# Patient Record
Sex: FEMALE | Race: ASIAN | Employment: STUDENT | URBAN - METROPOLITAN AREA
[De-identification: names, ages, dates, MRNs, and addresses within clinical notes are randomized per-mention and may not be internally consistent; named-entity substitution may affect disease eponyms.]

---
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
## 2022-06-10 ENCOUNTER — OFFICE VISIT (OUTPATIENT)
Dept: FAMILY MEDICINE CLINIC | Facility: CLINIC | Age: 22
End: 2022-06-10
Payer: COMMERCIAL

## 2022-06-10 VITALS
HEART RATE: 76 BPM | HEIGHT: 64 IN | OXYGEN SATURATION: 99 % | WEIGHT: 149.81 LBS | BODY MASS INDEX: 25.57 KG/M2 | TEMPERATURE: 98 F | SYSTOLIC BLOOD PRESSURE: 104 MMHG | DIASTOLIC BLOOD PRESSURE: 64 MMHG | RESPIRATION RATE: 14 BRPM

## 2022-06-10 DIAGNOSIS — L98.9 SKIN LESION: ICD-10-CM

## 2022-06-10 DIAGNOSIS — Z30.014 ENCOUNTER FOR INITIAL PRESCRIPTION OF INTRAUTERINE CONTRACEPTIVE DEVICE (IUD): ICD-10-CM

## 2022-06-10 DIAGNOSIS — N92.6 IRREGULAR MENSES: Primary | ICD-10-CM

## 2022-06-10 DIAGNOSIS — Z11.8 SCREENING FOR CHLAMYDIAL DISEASE: ICD-10-CM

## 2022-06-10 LAB — CONTROL LINE PRESENT WITH A CLEAR BACKGROUND (YES/NO): YES YES/NO

## 2022-06-10 PROCEDURE — 87591 N.GONORRHOEAE DNA AMP PROB: CPT | Performed by: FAMILY MEDICINE

## 2022-06-10 PROCEDURE — 81025 URINE PREGNANCY TEST: CPT | Performed by: FAMILY MEDICINE

## 2022-06-10 PROCEDURE — 87491 CHLMYD TRACH DNA AMP PROBE: CPT | Performed by: FAMILY MEDICINE

## 2022-06-10 PROCEDURE — 3078F DIAST BP <80 MM HG: CPT | Performed by: FAMILY MEDICINE

## 2022-06-10 PROCEDURE — 3074F SYST BP LT 130 MM HG: CPT | Performed by: FAMILY MEDICINE

## 2022-06-10 PROCEDURE — 3008F BODY MASS INDEX DOCD: CPT | Performed by: FAMILY MEDICINE

## 2022-06-10 PROCEDURE — 99214 OFFICE O/P EST MOD 30 MIN: CPT | Performed by: FAMILY MEDICINE

## 2022-06-10 RX ORDER — NORGESTIMATE AND ETHINYL ESTRADIOL 7DAYSX3 LO
1 KIT ORAL DAILY
Qty: 3 EACH | Refills: 3 | Status: SHIPPED | OUTPATIENT
Start: 2022-06-10 | End: 2023-06-05

## 2022-06-11 PROBLEM — N92.6 IRREGULAR MENSES: Status: ACTIVE | Noted: 2022-06-11

## 2022-06-13 LAB
C TRACH DNA SPEC QL NAA+PROBE: NEGATIVE
N GONORRHOEA DNA SPEC QL NAA+PROBE: NEGATIVE

## 2022-06-24 ENCOUNTER — LABORATORY ENCOUNTER (OUTPATIENT)
Dept: LAB | Age: 22
End: 2022-06-24
Attending: FAMILY MEDICINE
Payer: COMMERCIAL

## 2022-06-24 DIAGNOSIS — N92.6 IRREGULAR MENSES: ICD-10-CM

## 2022-06-24 LAB
FSH SERPL-ACNC: 1.7 MIU/ML
PROGEST SERPL-MCNC: 1.36 NG/ML
PROLACTIN SERPL-MCNC: 13 NG/ML
TSI SER-ACNC: 2.04 MIU/ML (ref 0.36–3.74)

## 2022-06-24 PROCEDURE — 84146 ASSAY OF PROLACTIN: CPT | Performed by: FAMILY MEDICINE

## 2022-06-24 PROCEDURE — 84402 ASSAY OF FREE TESTOSTERONE: CPT | Performed by: FAMILY MEDICINE

## 2022-06-24 PROCEDURE — 82627 DEHYDROEPIANDROSTERONE: CPT | Performed by: FAMILY MEDICINE

## 2022-06-24 PROCEDURE — 84403 ASSAY OF TOTAL TESTOSTERONE: CPT | Performed by: FAMILY MEDICINE

## 2022-06-24 PROCEDURE — 84144 ASSAY OF PROGESTERONE: CPT | Performed by: FAMILY MEDICINE

## 2022-06-24 PROCEDURE — 84443 ASSAY THYROID STIM HORMONE: CPT | Performed by: FAMILY MEDICINE

## 2022-06-24 PROCEDURE — 83001 ASSAY OF GONADOTROPIN (FSH): CPT | Performed by: FAMILY MEDICINE

## 2022-06-25 LAB — DHEA-S SERPL-MCNC: 304.9 UG/DL

## 2022-07-06 LAB
SEX HORMONE BINDING GLOBULIN: 117 NMOL/L
TESTOSTERONE -MS, BIOAVAILAB: 5 NG/DL
TESTOSTERONE, -MS/MS: 25 NG/DL
TESTOSTERONE, FREE -MS/MS: 1.7 PG/ML

## 2022-08-05 DIAGNOSIS — N92.6 IRREGULAR MENSES: ICD-10-CM

## 2022-08-08 DIAGNOSIS — N92.6 IRREGULAR MENSES: ICD-10-CM

## 2022-08-08 RX ORDER — NORGESTIMATE AND ETHINYL ESTRADIOL 7DAYSX3 LO
1 KIT ORAL DAILY
Qty: 3 EACH | Refills: 3 | OUTPATIENT
Start: 2022-08-08 | End: 2023-08-03

## 2022-08-14 ENCOUNTER — PATIENT MESSAGE (OUTPATIENT)
Dept: FAMILY MEDICINE CLINIC | Facility: CLINIC | Age: 22
End: 2022-08-14

## 2022-08-14 DIAGNOSIS — R41.840 ATTENTION DEFICIT: Primary | ICD-10-CM

## 2022-10-03 ENCOUNTER — TELEMEDICINE (OUTPATIENT)
Dept: FAMILY MEDICINE CLINIC | Facility: CLINIC | Age: 22
End: 2022-10-03

## 2022-10-03 DIAGNOSIS — B97.89 VIRAL RESPIRATORY ILLNESS: Primary | ICD-10-CM

## 2022-10-03 DIAGNOSIS — J98.8 VIRAL RESPIRATORY ILLNESS: Primary | ICD-10-CM

## 2022-10-03 NOTE — PROGRESS NOTES
Video Visit    This visit is conducted using Telemedicine with live, interactive video and audio. Brennan Mendosa  verbally consents to a Video visit. Patient understands and accepts financial responsibility for any deductible, co-insurance and/or co-pays associated with this service. Duration of the service: 9:05 minutes      Summary of topics discussed:     Possible Flu: Pt has had a sore throat that started on 9/29. The symptoms progressed wo a dry cough and fever the next day. She stayed home from school (she is in college). Since then her cough has become increasingly productive with with yellow and green sputum, as well has having a congested, stuffy nose. Her cough seems to be worse at night. She has no SOB, no chest tightness. She has some diarrhea. She did not test for Covid. She has been taking Dayquil and Ibuprofen prn. Of note, she had an acute Covid infection 1 month ago. ROS: as states per HPI  Physical Exam: Exam is limited due to no face to face visit today due to the Covid-19 pandemic. Pt is awake and alert, does not appear to be in acute distress. She has no cough during video visit, no wheezing, no conversational dyspnea. Assessment/Plan:  1) Acute viral illness  - onset on 9/29, advised to r/o Covid by taking an at home Covid test  - if positive please inform office; if negative and still having symptoms 7-10 days since onset, then please contact office and will assume she has bacterial infection and will send in ABx  - supportive care discussed  - declined Rx for Benzonatate      Return if symptoms worsen or fail to improve, for if COvid negative and still feeling sick end of this week, please call for ABx.       Stacey Elena MD

## 2022-11-04 DIAGNOSIS — N92.6 IRREGULAR MENSES: ICD-10-CM

## 2022-11-04 RX ORDER — NORGESTIMATE AND ETHINYL ESTRADIOL 7DAYSX3 LO
1 KIT ORAL DAILY
Qty: 3 EACH | Refills: 1 | Status: SHIPPED | OUTPATIENT
Start: 2022-11-04 | End: 2023-10-30

## 2022-11-07 NOTE — TELEPHONE ENCOUNTER
Spoke with patient, patient verbalized understating.  Has Follow up apt scheduled for 12/16/2022 at 2:00 pm.

## 2022-11-13 ENCOUNTER — PATIENT MESSAGE (OUTPATIENT)
Dept: FAMILY MEDICINE CLINIC | Facility: CLINIC | Age: 22
End: 2022-11-13

## 2023-01-24 ENCOUNTER — TELEPHONE (OUTPATIENT)
Dept: FAMILY MEDICINE CLINIC | Facility: CLINIC | Age: 23
End: 2023-01-24

## 2023-01-24 NOTE — TELEPHONE ENCOUNTER
Received her evaluation from Neuropsych (Dr. Irene Gonzáles). Please schedule appt to discuss results. [id number] [address] for virtual visit, if needed. Thanks.

## 2023-11-27 ENCOUNTER — OFFICE VISIT (OUTPATIENT)
Dept: FAMILY MEDICINE CLINIC | Facility: CLINIC | Age: 23
End: 2023-11-27
Payer: COMMERCIAL

## 2023-11-27 VITALS
SYSTOLIC BLOOD PRESSURE: 100 MMHG | OXYGEN SATURATION: 100 % | RESPIRATION RATE: 20 BRPM | WEIGHT: 148.19 LBS | BODY MASS INDEX: 25.3 KG/M2 | TEMPERATURE: 97 F | HEART RATE: 70 BPM | HEIGHT: 64 IN | DIASTOLIC BLOOD PRESSURE: 64 MMHG

## 2023-11-27 DIAGNOSIS — N92.6 IRREGULAR MENSES: ICD-10-CM

## 2023-11-27 DIAGNOSIS — Z23 NEED FOR VACCINATION: ICD-10-CM

## 2023-11-27 DIAGNOSIS — N92.6 MISSED PERIODS: Primary | ICD-10-CM

## 2023-11-27 LAB
CONTROL LINE PRESENT WITH A CLEAR BACKGROUND (YES/NO): YES YES/NO
KIT LOT #: NORMAL NUMERIC
PREGNANCY TEST, URINE: NEGATIVE

## 2023-11-27 PROCEDURE — 81025 URINE PREGNANCY TEST: CPT | Performed by: FAMILY MEDICINE

## 2023-11-27 PROCEDURE — 3008F BODY MASS INDEX DOCD: CPT | Performed by: FAMILY MEDICINE

## 2023-11-27 PROCEDURE — 99214 OFFICE O/P EST MOD 30 MIN: CPT | Performed by: FAMILY MEDICINE

## 2023-11-27 PROCEDURE — 3078F DIAST BP <80 MM HG: CPT | Performed by: FAMILY MEDICINE

## 2023-11-27 PROCEDURE — 3074F SYST BP LT 130 MM HG: CPT | Performed by: FAMILY MEDICINE

## 2023-11-27 RX ORDER — NORGESTIMATE AND ETHINYL ESTRADIOL 7DAYSX3 LO
1 KIT ORAL DAILY
Qty: 3 EACH | Refills: 1 | Status: SHIPPED | OUTPATIENT
Start: 2023-11-27 | End: 2024-11-21

## 2023-11-27 RX ORDER — NORGESTIMATE AND ETHINYL ESTRADIOL 7DAYSX3 LO
1 KIT ORAL DAILY
Qty: 3 EACH | Refills: 1 | Status: SHIPPED | OUTPATIENT
Start: 2023-11-27 | End: 2023-11-27

## 2023-12-27 ENCOUNTER — HOSPITAL ENCOUNTER (OUTPATIENT)
Dept: ULTRASOUND IMAGING | Age: 23
Discharge: HOME | End: 2023-12-27
Attending: FAMILY MEDICINE
Payer: COMMERCIAL

## 2023-12-27 DIAGNOSIS — N92.6 IRREGULAR MENSES: ICD-10-CM

## 2023-12-27 PROCEDURE — 76830 TRANSVAGINAL US NON-OB: CPT | Performed by: FAMILY MEDICINE

## 2023-12-27 PROCEDURE — 76856 US EXAM PELVIC COMPLETE: CPT | Performed by: FAMILY MEDICINE

## 2024-01-03 ENCOUNTER — OFFICE VISIT (OUTPATIENT)
Dept: FAMILY MEDICINE CLINIC | Facility: CLINIC | Age: 24
End: 2024-01-03
Payer: COMMERCIAL

## 2024-01-03 VITALS
HEART RATE: 59 BPM | OXYGEN SATURATION: 98 % | WEIGHT: 147 LBS | TEMPERATURE: 97 F | RESPIRATION RATE: 18 BRPM | BODY MASS INDEX: 25 KG/M2 | DIASTOLIC BLOOD PRESSURE: 62 MMHG | SYSTOLIC BLOOD PRESSURE: 108 MMHG

## 2024-01-03 DIAGNOSIS — Z12.4 CERVICAL CANCER SCREENING: ICD-10-CM

## 2024-01-03 DIAGNOSIS — N89.8 VAGINAL ITCHING: Primary | ICD-10-CM

## 2024-01-03 DIAGNOSIS — R35.0 FREQUENT URINATION: ICD-10-CM

## 2024-01-03 DIAGNOSIS — Z23 NEED FOR VACCINATION: ICD-10-CM

## 2024-01-03 LAB
APPEARANCE: CLEAR
BILIRUBIN: NEGATIVE
GLUCOSE (URINE DIPSTICK): NEGATIVE MG/DL
KETONES (URINE DIPSTICK): NEGATIVE MG/DL
LEUKOCYTES: NEGATIVE
MULTISTIX LOT#: NORMAL NUMERIC
NITRITE, URINE: NEGATIVE
OCCULT BLOOD: NEGATIVE
PH, URINE: 5.5 (ref 4.5–8)
PROTEIN (URINE DIPSTICK): NEGATIVE MG/DL
SPECIFIC GRAVITY: 1.03 (ref 1–1.03)
URINE-COLOR: YELLOW
UROBILINOGEN,SEMI-QN: 0.2 MG/DL (ref 0–1.9)

## 2024-01-03 PROCEDURE — 99214 OFFICE O/P EST MOD 30 MIN: CPT | Performed by: FAMILY MEDICINE

## 2024-01-03 PROCEDURE — 90715 TDAP VACCINE 7 YRS/> IM: CPT | Performed by: FAMILY MEDICINE

## 2024-01-03 PROCEDURE — 81514 NFCT DS BV&VAGINITIS DNA ALG: CPT | Performed by: FAMILY MEDICINE

## 2024-01-03 PROCEDURE — 90471 IMMUNIZATION ADMIN: CPT | Performed by: FAMILY MEDICINE

## 2024-01-03 PROCEDURE — 87591 N.GONORRHOEAE DNA AMP PROB: CPT | Performed by: FAMILY MEDICINE

## 2024-01-03 PROCEDURE — 87491 CHLMYD TRACH DNA AMP PROBE: CPT | Performed by: FAMILY MEDICINE

## 2024-01-03 PROCEDURE — 3074F SYST BP LT 130 MM HG: CPT | Performed by: FAMILY MEDICINE

## 2024-01-03 PROCEDURE — 3078F DIAST BP <80 MM HG: CPT | Performed by: FAMILY MEDICINE

## 2024-01-03 PROCEDURE — 81003 URINALYSIS AUTO W/O SCOPE: CPT | Performed by: FAMILY MEDICINE

## 2024-01-03 PROCEDURE — 88175 CYTOPATH C/V AUTO FLUID REDO: CPT | Performed by: FAMILY MEDICINE

## 2024-01-03 RX ORDER — FLUCONAZOLE 150 MG/1
150 TABLET ORAL ONCE
Qty: 1 TABLET | Refills: 0 | Status: SHIPPED | OUTPATIENT
Start: 2024-01-03 | End: 2024-01-03

## 2024-01-03 NOTE — PROGRESS NOTES
CHIEF COMPLAINT:   Chief Complaint   Patient presents with    UTI     Burning     Vaginal Problem     Itching          HPI:     Barnali Behari is a 23 year old female presents for UTI and vaginal itch.     Vaginal itch and possible UTI: She has had a vaginal itch x 4 wks, feeling itchy inside and outside. She has no vaginal discharge.  She also developed symptoms of burning with urination a week later after the vaginal itch started.  She is sexually active, had had these problems since being active with her new boyfriend.  She has no F/C, no flank pain or abdominal pain.              HISTORY:  Past Medical History:   Diagnosis Date    Anxiety 23    Depression 23    Femur fracture, left (HCC)       History reviewed. No pertinent surgical history.   Family History   Problem Relation Age of Onset    No Known Problems Mother     No Known Problems Father     Stroke Maternal Grandmother     Hypertension Maternal Grandmother             No Known Problems Maternal Grandfather     Hypertension Paternal Grandmother     Cancer Paternal Grandfather               Social History:   Social History     Socioeconomic History    Marital status: Single   Tobacco Use    Smoking status: Never    Smokeless tobacco: Never   Vaping Use    Vaping Use: Never used   Substance and Sexual Activity    Alcohol use: Not Currently     Alcohol/week: 3.0 standard drinks of alcohol     Types: 3 Cans of beer per week    Drug use: No   Other Topics Concern    Caffeine Concern No    Stress Concern Yes     Comment: Lack of sleep due to school    Weight Concern Yes     Comment: fluctuates a bit    Special Diet No    Exercise No    Seat Belt No        Medications (Active prior to today's visit):  Current Outpatient Medications   Medication Sig Dispense Refill    fluconazole 150 MG Oral Tab Take 1 tablet (150 mg total) by mouth once for 1 dose. 1 tablet 0    Norgestim-Eth Estrad Triphasic (ORTHO TRI-CYCLEN LO) 0.18/0.215/0.25  MG-25 MCG Oral Tab Take 1 tablet by mouth daily. 3 each 0       Allergies:  No Known Allergies    PSFH elements reviewed from today and agreed except as otherwise stated in HPI.  ROS:     Review of Systems   Constitutional:  Negative for appetite change, chills and fatigue.   Genitourinary:  Positive for dysuria. Negative for flank pain, hematuria, pelvic pain and vaginal discharge.        Vaginal itch   Musculoskeletal:  Negative for back pain.         Pertinent positives and negatives noted in the the HPI.    PHYSICAL EXAM:   /62 (BP Location: Left arm, Patient Position: Sitting, Cuff Size: adult)   Pulse 59   Temp 97.2 °F (36.2 °C) (Temporal)   Resp 18   Wt 147 lb (66.7 kg)   LMP 07/01/2023 (Approximate)   SpO2 98%   BMI 25.23 kg/m²   Vital signs reviewed.Appears stated age, well groomed.  Physical Exam  Vitals reviewed.   Constitutional:       Appearance: Normal appearance.   HENT:      Head: Normocephalic.   Cardiovascular:      Rate and Rhythm: Normal rate and regular rhythm.      Pulses: Normal pulses.      Heart sounds: Normal heart sounds.   Pulmonary:      Effort: Pulmonary effort is normal.      Breath sounds: Normal breath sounds.   Abdominal:      General: Bowel sounds are normal. There is no distension.      Palpations: Abdomen is soft.      Tenderness: There is no abdominal tenderness. There is no right CVA tenderness, left CVA tenderness or guarding.   Genitourinary:     General: Normal vulva.      Vagina: Vaginal discharge present.      Comments: Speculum exam: vaginal mucosa normal, has scant amount of thick white discharge discharge. Cervix is closed and has no erythema, is not friable. Thinprep pap collected today.    Bimanual exam: Uterus is nontender and normal size. No adnexal fullness or tenderness. No CMT.      Skin:     General: Skin is warm and dry.   Neurological:      Mental Status: She is alert and oriented to person, place, and time.   Psychiatric:         Behavior:  Behavior normal.          LABS     Office Visit on 01/03/2024   Component Date Value    Glucose Urine 01/03/2024 Negative     Bilirubin Urine 01/03/2024 Negative     Ketones, UA 01/03/2024 Negative     Spec Gravity 01/03/2024 1.030     Blood Urine 01/03/2024 Negative     PH Urine 01/03/2024 5.5     Protein Urine 01/03/2024 Negative     Urobilinogen Urine 01/03/2024 0.2     Nitrite Urine 01/03/2024 Negative     Leukocyte Esterase Urine 01/03/2024 Negative     APPEARANCE 01/03/2024 clear     Color Urine 01/03/2024 yellow     Multistix Lot# 01/03/2024 [id number]     Multistix Expiration Date 01/03/2024 1/31/24    Office Visit on 11/27/2023   Component Date Value    Pregnancy Test, Urine 11/27/2023 negative     Control Line Present wit* 11/27/2023 yes     Kit Lot # 11/27/2023 [id number]     Kit Expiration Date 11/27/2023 01/04/2025       REVIEWED THIS VISIT  ASSESSMENT/PLAN:   23 year old female with    1. Vaginal itching  - vaginal culture collected today, await results  - empirically start Fluconazole given clinical appearance of discharge  - check GC/Chlamydia    - Vaginitis Vaginosis PCR Panel; Future  - Chlamydia/Gc Amplification [E]; Future  - fluconazole 150 MG Oral Tab; Take 1 tablet (150 mg total) by mouth once for 1 dose.  Dispense: 1 tablet; Refill: 0  - Vaginitis Vaginosis PCR Panel  - Chlamydia/Gc Amplification [E]    2. Frequent urination  - UA normal    - URINALYSIS, AUTO, W/O SCOPE    3. Cervical cancer screening    - ThinPrep PAP with HPV Reflex Request; Future  - ThinPrep PAP with HPV Reflex Request    4. Need for vaccination    - TdaP (Adacel, Boostrix) [47613]      Meds This Visit:  Requested Prescriptions     Signed Prescriptions Disp Refills    fluconazole 150 MG Oral Tab 1 tablet 0     Sig: Take 1 tablet (150 mg total) by mouth once for 1 dose.       Health Maintenance:  Health Maintenance   Topic Date Due    Annual Physical  Never done    Pap Smear  Never done    Chlamydia Screening  06/10/2023     COVID-19 Vaccine (5 - 2023-24 season) 09/01/2023    Annual Depression Screening  01/01/2024    Influenza Vaccine (1) 06/30/2024 (Originally 10/1/2023)    DTaP,Tdap,and Td Vaccines (8 - Td or Tdap) 01/03/2034    Hepatitis B Vaccines  Completed    Hepatitis A Vaccines  Completed    MMR Vaccines  Completed    Varicella Vaccines  Completed    Meningococcal Vaccine  Completed    HPV Vaccines  Completed    Pneumococcal Vaccine: Birth to 64yrs  Aged Out         Patient/Caregiver Education: There are no barriers to learning. Medical education done.   Outcome: Patient verbalizes understanding and agrees with plan. Advised to call or RTC if symptoms persist or worsen.    Problem List:     Patient Active Problem List   Diagnosis    Healthy adult on routine physical examination    Irregular menses       Imaging & Referrals:  TETANUS, DIPHTHERIA TOXOIDS AND ACELLULAR PERTUSIS VACCINE (TDAP), >7 YEARS, IM USE     1/3/2024  Clarissa Fitch MD      Patient understands plan and follow-up.  Return for annual physical overdue.

## 2024-01-04 LAB
BV BACTERIA DNA VAG QL NAA+PROBE: NEGATIVE
C GLABRATA DNA VAG QL NAA+PROBE: NEGATIVE
C KRUSEI DNA VAG QL NAA+PROBE: NEGATIVE
C TRACH DNA SPEC QL NAA+PROBE: NEGATIVE
CANDIDA DNA VAG QL NAA+PROBE: NEGATIVE
N GONORRHOEA DNA SPEC QL NAA+PROBE: NEGATIVE
T VAGINALIS DNA VAG QL NAA+PROBE: NEGATIVE

## 2024-01-08 LAB
.: NORMAL
.: NORMAL

## 2024-05-31 ENCOUNTER — PATIENT MESSAGE (OUTPATIENT)
Dept: FAMILY MEDICINE CLINIC | Facility: CLINIC | Age: 24
End: 2024-05-31

## 2024-06-04 NOTE — TELEPHONE ENCOUNTER
From: Barnali Behari  To: Clarissa Fitch  Sent: 5/31/2024 7:52 PM CDT  Subject: Question about contraception     Hello Dr. Taylor, I am on the mixed pill, Tri-Lo.     Recently I decided to skip my period by discarding placebo pills and starting my new pack right away, I have taken the first two pills, which corresponded to Wednesday and Thursday (May 29, May 30).    But I am travelling for today until monday (May 31-June 3rd) and have forgotten my pills at home.     I was wondering if I am protected today (May 31st)?  And should I consider emergency contraception for the remaining days or will the “placebo” week protection continue?     Thank you!     Regards,  Barnali Behari

## 2024-06-07 ENCOUNTER — OFFICE VISIT (OUTPATIENT)
Dept: FAMILY MEDICINE CLINIC | Facility: CLINIC | Age: 24
End: 2024-06-07
Payer: COMMERCIAL

## 2024-06-07 VITALS
RESPIRATION RATE: 18 BRPM | TEMPERATURE: 98 F | HEART RATE: 87 BPM | OXYGEN SATURATION: 98 % | BODY MASS INDEX: 25.29 KG/M2 | HEIGHT: 65 IN | DIASTOLIC BLOOD PRESSURE: 60 MMHG | SYSTOLIC BLOOD PRESSURE: 118 MMHG | WEIGHT: 151.81 LBS

## 2024-06-07 DIAGNOSIS — Z11.8 SCREENING FOR CHLAMYDIAL DISEASE: ICD-10-CM

## 2024-06-07 DIAGNOSIS — R41.3 MEMORY PROBLEM: ICD-10-CM

## 2024-06-07 DIAGNOSIS — Z13.21 SCREENING FOR ENDOCRINE, NUTRITIONAL, METABOLIC AND IMMUNITY DISORDER: ICD-10-CM

## 2024-06-07 DIAGNOSIS — Z00.00 ANNUAL PHYSICAL EXAM: Primary | ICD-10-CM

## 2024-06-07 DIAGNOSIS — N92.6 IRREGULAR MENSES: ICD-10-CM

## 2024-06-07 DIAGNOSIS — Z13.29 SCREENING FOR ENDOCRINE, NUTRITIONAL, METABOLIC AND IMMUNITY DISORDER: ICD-10-CM

## 2024-06-07 DIAGNOSIS — Z13.6 SCREENING FOR ISCHEMIC HEART DISEASE: ICD-10-CM

## 2024-06-07 DIAGNOSIS — E55.9 VITAMIN D DEFICIENCY: ICD-10-CM

## 2024-06-07 DIAGNOSIS — Z13.0 SCREENING FOR ENDOCRINE, NUTRITIONAL, METABOLIC AND IMMUNITY DISORDER: ICD-10-CM

## 2024-06-07 DIAGNOSIS — N92.6 MISSED PERIODS: ICD-10-CM

## 2024-06-07 DIAGNOSIS — Z13.228 SCREENING FOR ENDOCRINE, NUTRITIONAL, METABOLIC AND IMMUNITY DISORDER: ICD-10-CM

## 2024-06-07 PROCEDURE — 3078F DIAST BP <80 MM HG: CPT | Performed by: FAMILY MEDICINE

## 2024-06-07 PROCEDURE — 99395 PREV VISIT EST AGE 18-39: CPT | Performed by: FAMILY MEDICINE

## 2024-06-07 PROCEDURE — 3074F SYST BP LT 130 MM HG: CPT | Performed by: FAMILY MEDICINE

## 2024-06-07 PROCEDURE — 3008F BODY MASS INDEX DOCD: CPT | Performed by: FAMILY MEDICINE

## 2024-06-07 RX ORDER — NORGESTIMATE AND ETHINYL ESTRADIOL 7DAYSX3 LO
1 KIT ORAL DAILY
Qty: 3 EACH | Refills: 0 | Status: SHIPPED | OUTPATIENT
Start: 2024-06-07 | End: 2025-06-02

## 2024-06-08 NOTE — PROGRESS NOTES
Chief Complaint   Patient presents with    Physical     Annual exam w/o pap exam        HPI:   Barnali Behari is a 24 year old female who presents for a complete physical without gyne exam.   Patient feels well, dental visit up to date, no hearing problem.  Vaccinations: UTD    LMP: 24  Sexual activity:not discussed  Contraception: on OCPs  Exercise: occasional.  Diet:  regular    Wt Readings from Last 3 Encounters:   24 151 lb 12.8 oz (68.9 kg)   24 147 lb (66.7 kg)   23 148 lb 3.2 oz (67.2 kg)      BP Readings from Last 3 Encounters:   24 118/60   24 108/62   23 100/64     Patient's last menstrual period was 2024 (approximate).     Annual physical:  Overall pt states she feels well. She graduated from [employer] and is now home from school, looking for a job.    LMP: 24  Sexually Active: not discussed  Last pap smear:  2024, normal pap (rpt in 3 yrs)  Last mammogram: n/a  Last colonoscopy: n/a  Last DEXA Scan: n/a    Exercise: occasional  Diet: regular    Pt states she has some problems with remembering things, more short term memory.   She is requesting Vit B12 level check.     Also requesting Vit D level check.            Current Outpatient Medications   Medication Sig Dispense Refill    Norgestim-Eth Estrad Triphasic (ORTHO TRI-CYCLEN LO) 0.18/0.215/0.25 MG-25 MCG Oral Tab Take 1 tablet by mouth daily. 3 each 0      Past Medical History:    Anxiety    Depression    Femur fracture, left (HCC)      History reviewed. No pertinent surgical history.   Family History   Problem Relation Age of Onset    No Known Problems Mother     No Known Problems Father     Stroke Maternal Grandmother     Hypertension Maternal Grandmother             No Known Problems Maternal Grandfather     Hypertension Paternal Grandmother     Cancer Paternal Grandfather               Social History:  Social History     Socioeconomic History    Marital status: Single    Tobacco Use    Smoking status: Never    Smokeless tobacco: Never   Vaping Use    Vaping status: Never Used   Substance and Sexual Activity    Alcohol use: Not Currently     Alcohol/week: 3.0 standard drinks of alcohol     Types: 3 Cans of beer per week    Drug use: Yes     Frequency: 1.0 times per week     Types: Cannabis   Other Topics Concern    Caffeine Concern No    Stress Concern Yes     Comment: Lack of sleep due to school    Weight Concern Yes     Comment: fluctuates a bit    Special Diet No    Exercise No    Seat Belt No       Allergies:  No Known Allergies     REVIEW OF SYSTEMS:     Review of Systems   Constitutional:  Negative for appetite change and unexpected weight change.   Gastrointestinal:  Negative for abdominal pain, constipation, diarrhea, nausea and vomiting.   Genitourinary:  Negative for dysuria and menstrual problem.   Psychiatric/Behavioral:  Negative for decreased concentration.         EXAM:   /60 (BP Location: Right arm, Patient Position: Sitting, Cuff Size: adult)   Pulse 87   Temp 98 °F (36.7 °C) (Temporal)   Resp 18   Ht 5' 5\" (1.651 m)   Wt 151 lb 12.8 oz (68.9 kg)   LMP 05/30/2024 (Approximate)   SpO2 98%   BMI 25.26 kg/m²    GENERAL: WD/WN in no acute distress.   HEENT: PERRLA and EOMI.  OP moist no lesions.TM WNL, shad.Normal ears canals bilaterally.  Neck is supple, with no cervical LAD or thyroid abnormalities.  LUNGS: are clear to auscultation bilaterally, with no wheeze, rhonchi, or rales.   HEART: is RRR.  S1, S2, with no murmurs,clicks, gallops  BREAST:deferred  ABDOMEN: is soft,NBS, NT/ND with no HSM.  No rebound or guarding. No CVA tenderness, no hernias.   EXAM:deferred  RECTAL EXAM: deferred  NEURO: Cranial nerves II-XII normal,no focal abnormalities, and reflexes coordination and gait normal and symmetric.Sensation intact.  EXTREMETIES: are symmetric with no cyanosis, clubbing, or edema.  MS: Normal muscles tones, no joints abnormalities.  SKIN: Normal  color, turgor, no lesions, rashes or wounds.  PSYCH: normal affect and mood.    ASSESSMENT AND PLAN:     24 year old female with     1. Annual physical exam  Routine labs ordered today, await results. Counseled pt on healthy lifestyle changes. Vaccines today: UTD . Contraception: OCPs .     Last pap smear:  1/2024, normal pap (rpt in 3 yrs)    - Norgestim-Eth Estrad Triphasic (ORTHO TRI-CYCLEN LO) 0.18/0.215/0.25 MG-25 MCG Oral Tab; Take 1 tablet by mouth daily.  Dispense: 3 each; Refill: 0  - CBC With Differential With Platelet; Future  - Comp Metabolic Panel; Future  - Lipid Panel; Future  - TSH W Reflex To Free T4; Future    2. Missed periods  On OCPs for this    - Norgestim-Eth Estrad Triphasic (ORTHO TRI-CYCLEN LO) 0.18/0.215/0.25 MG-25 MCG Oral Tab; Take 1 tablet by mouth daily.  Dispense: 3 each; Refill: 0    3. Irregular menses  On OCPs for this    - Norgestim-Eth Estrad Triphasic (ORTHO TRI-CYCLEN LO) 0.18/0.215/0.25 MG-25 MCG Oral Tab; Take 1 tablet by mouth daily.  Dispense: 3 each; Refill: 0    4. Vitamin D deficiency  - pt requesting recheck of level    - Vitamin D [E]; Future    5. Memory problem  - check Vit B12 level    - Vitamin B12 [E]; Future    6. Screening for ischemic heart disease    - Lipid Panel; Future    7. Screening for endocrine, nutritional, metabolic and immunity disorder    - CBC With Differential With Platelet; Future  - Comp Metabolic Panel; Future  - TSH W Reflex To Free T4; Future    8. Screening for chlamydial disease          Pt's was recommended low fat diet and aerobic exercise 30 minutes three times weekly.   Health maintenance.   Osteoporosis prevention addressed  Recommended whole food type diet, eliminate processed food/low sugar and low sat fat diet      The patient indicates understanding of these issues and agrees to the plan.    Return in about 1 year (around 6/7/2025) for annual physical.

## 2024-06-17 ENCOUNTER — NURSE ONLY (OUTPATIENT)
Dept: FAMILY MEDICINE CLINIC | Facility: CLINIC | Age: 24
End: 2024-06-17
Payer: COMMERCIAL

## 2024-06-17 DIAGNOSIS — E55.9 VITAMIN D DEFICIENCY: ICD-10-CM

## 2024-06-17 DIAGNOSIS — Z13.0 SCREENING FOR ENDOCRINE, NUTRITIONAL, METABOLIC AND IMMUNITY DISORDER: ICD-10-CM

## 2024-06-17 DIAGNOSIS — Z13.228 SCREENING FOR ENDOCRINE, NUTRITIONAL, METABOLIC AND IMMUNITY DISORDER: ICD-10-CM

## 2024-06-17 DIAGNOSIS — Z00.00 ANNUAL PHYSICAL EXAM: ICD-10-CM

## 2024-06-17 DIAGNOSIS — Z13.29 SCREENING FOR ENDOCRINE, NUTRITIONAL, METABOLIC AND IMMUNITY DISORDER: ICD-10-CM

## 2024-06-17 DIAGNOSIS — R41.3 MEMORY PROBLEM: ICD-10-CM

## 2024-06-17 DIAGNOSIS — Z13.6 SCREENING FOR ISCHEMIC HEART DISEASE: ICD-10-CM

## 2024-06-17 DIAGNOSIS — Z13.21 SCREENING FOR ENDOCRINE, NUTRITIONAL, METABOLIC AND IMMUNITY DISORDER: ICD-10-CM

## 2024-06-17 LAB
ALBUMIN SERPL-MCNC: 3.6 G/DL (ref 3.4–5)
ALBUMIN/GLOB SERPL: 0.8 {RATIO} (ref 1–2)
ALP LIVER SERPL-CCNC: 53 U/L
ALT SERPL-CCNC: 23 U/L
ANION GAP SERPL CALC-SCNC: 6 MMOL/L (ref 0–18)
AST SERPL-CCNC: 15 U/L (ref 15–37)
BASOPHILS # BLD AUTO: 0.03 X10(3) UL (ref 0–0.2)
BASOPHILS NFR BLD AUTO: 0.5 %
BILIRUB SERPL-MCNC: 0.4 MG/DL (ref 0.1–2)
BUN BLD-MCNC: 9 MG/DL (ref 9–23)
CALCIUM BLD-MCNC: 9.6 MG/DL (ref 8.5–10.1)
CHLORIDE SERPL-SCNC: 109 MMOL/L (ref 98–112)
CHOLEST SERPL-MCNC: 214 MG/DL (ref ?–200)
CO2 SERPL-SCNC: 23 MMOL/L (ref 21–32)
CREAT BLD-MCNC: 1.11 MG/DL
EGFRCR SERPLBLD CKD-EPI 2021: 72 ML/MIN/1.73M2 (ref 60–?)
EOSINOPHIL # BLD AUTO: 0.1 X10(3) UL (ref 0–0.7)
EOSINOPHIL NFR BLD AUTO: 1.8 %
ERYTHROCYTE [DISTWIDTH] IN BLOOD BY AUTOMATED COUNT: 15.8 %
FASTING PATIENT LIPID ANSWER: YES
FASTING STATUS PATIENT QL REPORTED: YES
GLOBULIN PLAS-MCNC: 4.5 G/DL (ref 2.8–4.4)
GLUCOSE BLD-MCNC: 97 MG/DL (ref 70–99)
HCT VFR BLD AUTO: 41.5 %
HDLC SERPL-MCNC: 51 MG/DL (ref 40–59)
HGB BLD-MCNC: 13.3 G/DL
IMM GRANULOCYTES # BLD AUTO: 0.01 X10(3) UL (ref 0–1)
IMM GRANULOCYTES NFR BLD: 0.2 %
LDLC SERPL CALC-MCNC: 142 MG/DL (ref ?–100)
LYMPHOCYTES # BLD AUTO: 1.54 X10(3) UL (ref 1–4)
LYMPHOCYTES NFR BLD AUTO: 27.7 %
MCH RBC QN AUTO: 26.6 PG (ref 26–34)
MCHC RBC AUTO-ENTMCNC: 32 G/DL (ref 31–37)
MCV RBC AUTO: 83 FL
MONOCYTES # BLD AUTO: 0.3 X10(3) UL (ref 0.1–1)
MONOCYTES NFR BLD AUTO: 5.4 %
NEUTROPHILS # BLD AUTO: 3.58 X10 (3) UL (ref 1.5–7.7)
NEUTROPHILS # BLD AUTO: 3.58 X10(3) UL (ref 1.5–7.7)
NEUTROPHILS NFR BLD AUTO: 64.4 %
NONHDLC SERPL-MCNC: 163 MG/DL (ref ?–130)
OSMOLALITY SERPL CALC.SUM OF ELEC: 285 MOSM/KG (ref 275–295)
PLATELET # BLD AUTO: 183 10(3)UL (ref 150–450)
PLATELETS.RETICULATED NFR BLD AUTO: 17.6 % (ref 0–7)
POTASSIUM SERPL-SCNC: 4.3 MMOL/L (ref 3.5–5.1)
PROT SERPL-MCNC: 8.1 G/DL (ref 6.4–8.2)
RBC # BLD AUTO: 5 X10(6)UL
SODIUM SERPL-SCNC: 138 MMOL/L (ref 136–145)
TRIGL SERPL-MCNC: 117 MG/DL (ref 30–149)
TSI SER-ACNC: 1.09 MIU/ML (ref 0.36–3.74)
VIT B12 SERPL-MCNC: 244 PG/ML (ref 193–986)
VIT D+METAB SERPL-MCNC: 14.4 NG/ML (ref 30–100)
VLDLC SERPL CALC-MCNC: 22 MG/DL (ref 0–30)
WBC # BLD AUTO: 5.6 X10(3) UL (ref 4–11)

## 2024-06-17 PROCEDURE — 36415 COLL VENOUS BLD VENIPUNCTURE: CPT | Performed by: FAMILY MEDICINE

## 2024-06-17 PROCEDURE — 80050 GENERAL HEALTH PANEL: CPT | Performed by: FAMILY MEDICINE

## 2024-06-17 PROCEDURE — 82306 VITAMIN D 25 HYDROXY: CPT | Performed by: FAMILY MEDICINE

## 2024-06-17 PROCEDURE — 82607 VITAMIN B-12: CPT | Performed by: FAMILY MEDICINE

## 2024-06-17 PROCEDURE — 80061 LIPID PANEL: CPT | Performed by: FAMILY MEDICINE

## 2024-06-17 NOTE — PROGRESS NOTES
Patient came in for draw of ordered fasting labs. Patient drawn out of right AC, x 1 attempt and tolerated well.  1 lav 2 ltgrn 1 gold  tube drawn.

## 2024-06-20 DIAGNOSIS — E78.2 MIXED HYPERLIPIDEMIA: ICD-10-CM

## 2024-06-20 DIAGNOSIS — E55.9 VITAMIN D DEFICIENCY: Primary | ICD-10-CM

## 2024-06-20 DIAGNOSIS — R79.89 ABNORMAL CBC: ICD-10-CM

## 2024-06-20 RX ORDER — FOLIC ACID 1 MG/1
1 TABLET ORAL WEEKLY
Qty: 12 CAPSULE | Refills: 0 | Status: SHIPPED | OUTPATIENT
Start: 2024-06-20 | End: 2024-06-23

## 2024-06-21 ENCOUNTER — PATIENT MESSAGE (OUTPATIENT)
Dept: FAMILY MEDICINE CLINIC | Facility: CLINIC | Age: 24
End: 2024-06-21

## 2024-06-21 DIAGNOSIS — E55.9 VITAMIN D DEFICIENCY: ICD-10-CM

## 2024-06-23 RX ORDER — FOLIC ACID 1 MG/1
1 TABLET ORAL WEEKLY
Qty: 12 CAPSULE | Refills: 0 | Status: SHIPPED | OUTPATIENT
Start: 2024-06-20

## 2024-06-23 RX ORDER — FOLIC ACID 1 MG/1
1 TABLET ORAL WEEKLY
Qty: 12 CAPSULE | Refills: 0 | Status: SHIPPED | OUTPATIENT
Start: 2024-06-20 | End: 2024-06-23

## 2024-06-23 NOTE — TELEPHONE ENCOUNTER
From: Barnali Behari  To: Clarissa Fitch  Sent: 6/21/2024 12:56 PM CDT  Subject: Vitamin D Prescription Location    Hello,     I was prescribed a dosage of vitamin D. However I think it was sent to a different pharmacy than I prefer.    I would like to go to [facility] on [address]. I have removed the pharmacy it was sent to so that there’s no future confusion! Thank you.     Best,  Barnali Behari

## 2024-07-02 DIAGNOSIS — N92.6 MISSED PERIODS: ICD-10-CM

## 2024-07-02 DIAGNOSIS — Z00.00 ANNUAL PHYSICAL EXAM: ICD-10-CM

## 2024-07-02 DIAGNOSIS — N92.6 IRREGULAR MENSES: ICD-10-CM

## 2024-07-03 RX ORDER — NORGESTIMATE AND ETHINYL ESTRADIOL 7DAYSX3 LO
1 KIT ORAL DAILY
Qty: 3 EACH | Refills: 0 | Status: SHIPPED | OUTPATIENT
Start: 2024-07-03 | End: 2025-06-28

## 2024-07-04 NOTE — TELEPHONE ENCOUNTER
Refill Passed Protocol:     Pt requesting refill of   Requested Prescriptions     Pending Prescriptions Disp Refills    Norgestim-Eth Estrad Triphasic (ORTHO TRI-CYCLEN LO) 0.18/0.215/0.25 MG-25 MCG Oral Tab 3 each 0     Sig: Take 1 tablet by mouth daily.      Refill was approved and sent to pharmacy:     Gynecology Medication Protocol [id number]/02/2024 12:26 PM    PASS-PENDING LAST PAP WNL--VIA MANUAL LOOKUP    Physical or Pelvic/Breast in past 12 or next 3 mos--VIA MANUAL LOOKU        Last Time Medication was Filled:  6/07/2024    Last Office Visit with Provider: 6/07/2024    No future appointments.

## 2024-07-11 DIAGNOSIS — Z00.00 ANNUAL PHYSICAL EXAM: ICD-10-CM

## 2024-07-11 DIAGNOSIS — N92.6 MISSED PERIODS: ICD-10-CM

## 2024-07-11 DIAGNOSIS — N92.6 IRREGULAR MENSES: ICD-10-CM

## 2024-07-11 RX ORDER — NORGESTIMATE AND ETHINYL ESTRADIOL 7DAYSX3 LO
1 KIT ORAL DAILY
Qty: 3 EACH | Refills: 0 | OUTPATIENT
Start: 2024-07-11 | End: 2025-07-06

## 2024-07-11 NOTE — TELEPHONE ENCOUNTER
Requested Prescriptions     Pending Prescriptions Disp Refills    Norgestim-Eth Estrad Triphasic (ORTHO TRI-CYCLEN LO) 0.18/0.215/0.25 MG-25 MCG Oral Tab 3 each 0     Sig: Take 1 tablet by mouth daily.     Duplicate request  Refill denied

## 2024-11-02 DIAGNOSIS — N92.6 IRREGULAR MENSES: ICD-10-CM

## 2024-11-02 DIAGNOSIS — N92.6 MISSED PERIODS: ICD-10-CM

## 2024-11-02 DIAGNOSIS — Z00.00 ANNUAL PHYSICAL EXAM: ICD-10-CM

## 2024-11-06 RX ORDER — NORGESTIMATE AND ETHINYL ESTRADIOL 7DAYSX3 LO
1 KIT ORAL DAILY
Qty: 3 EACH | Refills: 0 | Status: SHIPPED | OUTPATIENT
Start: 2024-11-06 | End: 2025-11-01

## 2024-11-06 NOTE — TELEPHONE ENCOUNTER
Pt requesting refill of   Requested Prescriptions     Pending Prescriptions Disp Refills    Norgestim-Eth Estrad Triphasic (ORTHO TRI-CYCLEN LO) 0.18/0.215/0.25 MG-25 MCG Oral Tab 3 each 0     Sig: Take 1 tablet by mouth daily.      Last Time Medication was Filled: 7/03/2024    Last Office Visit with Provider: 10/07/2024  Annual physical exam  Routine labs ordered today, await results. Counseled pt on healthy lifestyle changes. Vaccines today: UTD . Contraception: OCPs .      Last pap smear:  1/2024, normal pap (rpt in 3 yrs)     - Norgestim-Eth Estrad Triphasic (ORTHO TRI-CYCLEN LO) 0.18/0.215/0.25 MG-25 MCG Oral Tab; Take 1 tablet by mouth daily.  Dispense: 3 each; Refill: 0    No future appointments.

## 2024-12-09 ENCOUNTER — OFFICE VISIT (OUTPATIENT)
Dept: FAMILY MEDICINE CLINIC | Facility: CLINIC | Age: 24
End: 2024-12-09
Payer: COMMERCIAL

## 2024-12-09 VITALS
BODY MASS INDEX: 25.69 KG/M2 | HEART RATE: 75 BPM | RESPIRATION RATE: 18 BRPM | SYSTOLIC BLOOD PRESSURE: 120 MMHG | TEMPERATURE: 98 F | WEIGHT: 154.19 LBS | OXYGEN SATURATION: 98 % | HEIGHT: 65 IN | DIASTOLIC BLOOD PRESSURE: 72 MMHG

## 2024-12-09 DIAGNOSIS — N89.8 VAGINAL ITCHING: Primary | ICD-10-CM

## 2024-12-09 DIAGNOSIS — R35.0 URINE FREQUENCY: ICD-10-CM

## 2024-12-09 LAB
APPEARANCE: CLEAR
BILIRUBIN: NEGATIVE
GLUCOSE (URINE DIPSTICK): NEGATIVE MG/DL
KETONES (URINE DIPSTICK): NEGATIVE MG/DL
LEUKOCYTES: NEGATIVE
MULTISTIX LOT#: ABNORMAL NUMERIC
NITRITE, URINE: NEGATIVE
OCCULT BLOOD: NEGATIVE
PH, URINE: 8.5 (ref 4.5–8)
PROTEIN (URINE DIPSTICK): NEGATIVE MG/DL
SPECIFIC GRAVITY: 1.02 (ref 1–1.03)
URINE-COLOR: YELLOW
UROBILINOGEN,SEMI-QN: 0.2 MG/DL (ref 0–1.9)

## 2024-12-09 PROCEDURE — 81514 NFCT DS BV&VAGINITIS DNA ALG: CPT | Performed by: FAMILY MEDICINE

## 2024-12-09 PROCEDURE — 3078F DIAST BP <80 MM HG: CPT | Performed by: FAMILY MEDICINE

## 2024-12-09 PROCEDURE — 81003 URINALYSIS AUTO W/O SCOPE: CPT | Performed by: FAMILY MEDICINE

## 2024-12-09 PROCEDURE — 3074F SYST BP LT 130 MM HG: CPT | Performed by: FAMILY MEDICINE

## 2024-12-09 PROCEDURE — 3008F BODY MASS INDEX DOCD: CPT | Performed by: FAMILY MEDICINE

## 2024-12-09 PROCEDURE — 99213 OFFICE O/P EST LOW 20 MIN: CPT | Performed by: FAMILY MEDICINE

## 2024-12-10 LAB
BV BACTERIA DNA VAG QL NAA+PROBE: NEGATIVE
C GLABRATA DNA VAG QL NAA+PROBE: NEGATIVE
C KRUSEI DNA VAG QL NAA+PROBE: NEGATIVE
CANDIDA DNA VAG QL NAA+PROBE: NEGATIVE
T VAGINALIS DNA VAG QL NAA+PROBE: NEGATIVE

## 2024-12-11 NOTE — PROGRESS NOTES
CHIEF COMPLAINT:   Chief Complaint   Patient presents with    UTI    Vaginal Problem     Oder itching          HPI:     Barnali Behari is a 24 year old female presents for possible UTI or vaginal infection.     problem: pt has had several days of burning and frequency with urination, also notes a cloudy appearance of her urine.  She reports she has been holding her urine and not always voiding when having intercourse.  She has no F/C and no N/V, no back pain.  Has some lower abdominal pressure and discomfort.  She currently has no vaginal discharge.               HISTORY:  Past Medical History:    Anxiety    Depression    Femur fracture, left (HCC)      No past surgical history on file.   Family History   Problem Relation Age of Onset    No Known Problems Mother     No Known Problems Father     Stroke Maternal Grandmother     Hypertension Maternal Grandmother             No Known Problems Maternal Grandfather     Hypertension Paternal Grandmother     Cancer Paternal Grandfather               Social History:   Social History     Socioeconomic History    Marital status: Single   Tobacco Use    Smoking status: Never    Smokeless tobacco: Never   Vaping Use    Vaping status: Never Used   Substance and Sexual Activity    Alcohol use: Not Currently     Alcohol/week: 3.0 standard drinks of alcohol     Types: 3 Cans of beer per week    Drug use: Yes     Frequency: 1.0 times per week     Types: Cannabis   Other Topics Concern    Caffeine Concern No    Stress Concern Yes     Comment: Lack of sleep due to school    Weight Concern Yes     Comment: fluctuates a bit    Special Diet No    Exercise No    Seat Belt No        Medications (Active prior to today's visit):  Current Outpatient Medications   Medication Sig Dispense Refill    Norgestim-Eth Estrad Triphasic (ORTHO TRI-CYCLEN LO) 0.18/0.215/0.25 MG-25 MCG Oral Tab Take 1 tablet by mouth daily. 3 each 0    lisdexamfetamine (VYVANSE) 30 MG Oral Cap Take 1  capsule (30 mg total) by mouth daily. 30 capsule 0    neomycin-polymyxin-hydrocortisone 3.5-20381-2 Otic Suspension Place 4 drops into the left ear 4 (four) times daily. 10 mL 0    lisdexamfetamine (VYVANSE) 30 MG Oral Cap Take 1 capsule (30 mg total) by mouth every morning. 30 capsule 0    Cholecalciferol (VITAMIN D3) 1.25 MG (19162 UT) Oral Cap Take 1 capsule by mouth once a week. 12 capsule 0       Allergies:  Allergies[1]    PSFH elements reviewed from today and agreed except as otherwise stated in HPI.  ROS:     Review of Systems   Constitutional:  Negative for chills and fever.   Genitourinary:  Positive for dysuria and frequency. Negative for genital sores, hematuria, menstrual problem, pelvic pain, vaginal bleeding, vaginal discharge and vaginal pain.         Pertinent positives and negatives noted in the the HPI.    PHYSICAL EXAM:   /72 (BP Location: Left arm, Patient Position: Sitting, Cuff Size: adult)   Pulse 75   Temp 98.1 °F (36.7 °C) (Temporal)   Resp 18   Ht 5' 5\" (1.651 m)   Wt 154 lb 3.2 oz (69.9 kg)   LMP 11/27/2024 (Approximate)   SpO2 98%   BMI 25.66 kg/m²   Vital signs reviewed.Appears stated age, well groomed.  Physical Exam  Vitals reviewed.   Constitutional:       General: She is not in acute distress.     Appearance: Normal appearance.   HENT:      Head: Normocephalic.   Cardiovascular:      Rate and Rhythm: Normal rate and regular rhythm.      Pulses: Normal pulses.      Heart sounds: Normal heart sounds.   Pulmonary:      Effort: Pulmonary effort is normal. No respiratory distress.      Breath sounds: Normal breath sounds.   Genitourinary:     Comments: Deferred exam. Pt opts for self-swab of vaginal culture.  Musculoskeletal:      Right lower leg: No edema.      Left lower leg: No edema.   Skin:     General: Skin is warm and dry.   Neurological:      General: No focal deficit present.      Mental Status: She is alert and oriented to person, place, and time.          LABS      Office Visit on 12/09/2024   Component Date Value    Glucose Urine 12/09/2024 Negative     Bilirubin Urine 12/09/2024 Negative     Ketones, UA 12/09/2024 Negative     Spec Gravity 12/09/2024 1.020     Blood Urine 12/09/2024 Negative     PH Urine 12/09/2024 8.5 (A)     Protein Urine 12/09/2024 Negative     Urobilinogen Urine 12/09/2024 0.2     Nitrite Urine 12/09/2024 Negative     Leukocyte Esterase Urine 12/09/2024 Negative     APPEARANCE 12/09/2024 clear     Color Urine 12/09/2024 yellow     Multistix Lot# 12/09/2024 [id number]     Multistix Expiration Date 12/09/2024 5/31/25     Bacterial Vaginosis 12/09/2024 Negative     Candida group 12/09/2024 Negative     Nakaseomyces glabrata (C* 12/09/2024 Negative     Pichia kudriavzevii (Can* 12/09/2024 Negative     Trichomonas vaginalis 12/09/2024 Negative       REVIEWED THIS VISIT  ASSESSMENT/PLAN:   24 year old female with    1. Vaginal itching  - pt opts for self swab fro vaginal cx, await results    - Vaginitis Vaginosis PCR Panel; Future  - Vaginitis Vaginosis PCR Panel    2. Urine frequency  - UA: normal  - is sx persist, the will need f-u appt    - URINALYSIS, AUTO, W/O SCOPE     The patient and provider have a longitudinal relationship to address/treat the serious or   complex condition(s) as stated in this encounter.       Meds This Visit:  Requested Prescriptions      No prescriptions requested or ordered in this encounter       Health Maintenance:  Health Maintenance   Topic Date Due    COVID-19 Vaccine (5 - 2024-25 season) 09/01/2024    Influenza Vaccine (1) 06/30/2025 (Originally 10/1/2024)    Chlamydia Screening  01/03/2025    Annual Physical  06/07/2025    Pap Smear  01/03/2027    DTaP,Tdap,and Td Vaccines (8 - Td or Tdap) 01/03/2034    Annual Depression Screening  Completed    HPV Vaccines  Completed    Pneumococcal Vaccine: Birth to 64yrs  Aged Out         Patient/Caregiver Education: There are no barriers to learning. Medical education done.   Outcome:  Patient verbalizes understanding and agrees with plan. Advised to call or RTC if symptoms persist or worsen.    Problem List:     Patient Active Problem List   Diagnosis    Healthy adult on routine physical examination    Irregular menses       Imaging & Referrals:  None     12/11/2024  Clarissa Fitch MD      Patient understands plan and follow-up.  Return for follow-up depending on lab results.              [1] No Known Allergies

## 2025-02-14 ENCOUNTER — PATIENT MESSAGE (OUTPATIENT)
Dept: FAMILY MEDICINE CLINIC | Facility: CLINIC | Age: 25
End: 2025-02-14

## 2025-03-19 DIAGNOSIS — N92.6 MISSED PERIODS: ICD-10-CM

## 2025-03-19 DIAGNOSIS — Z00.00 ANNUAL PHYSICAL EXAM: ICD-10-CM

## 2025-03-19 DIAGNOSIS — N92.6 IRREGULAR MENSES: ICD-10-CM

## 2025-03-21 RX ORDER — NORGESTIMATE AND ETHINYL ESTRADIOL 7DAYSX3 LO
1 KIT ORAL DAILY
Qty: 3 EACH | Refills: 0 | Status: SHIPPED | OUTPATIENT
Start: 2025-03-21 | End: 2026-03-16

## 2025-03-21 NOTE — TELEPHONE ENCOUNTER
Refill(s) Requested:   Requested Prescriptions     Pending Prescriptions Disp Refills    Norgestim-Eth Estrad Triphasic 0.18/0.215/0.25 MG-25 MCG Oral Tab 3 each 0     Sig: Take 1 tablet by mouth daily.     Medication Last Prescribed:  11/06/2024 3 each 0 refills     Has dose or medication been changed    since last prescription? *Review notes*    []  Yes       [x]  No     Last office visit: 12/9/2024 (in-office)  Visit date not found (virtual visit)     Relevant details from LOV note: Annual physical exam  Routine labs ordered today, await results. Counseled pt on healthy lifestyle changes. Vaccines today: UTD . Contraception: OCPs .      Last pap smear:  1/2024, normal pap (rpt in 3 yrs)     Patient was asked to follow-up in:  Return in about 1 year (around 6/7/2025) for annual physical.    Appointment due: June 2025    Future Appointments: Visit date not found     Action taken:  [x] Medication refilled per protocol

## (undated) NOTE — LETTER
Date: 4/6/2019    Patient Name: Taina Levy          To Whom it may concern: This letter has been written at the patient's request. The above patient was seen at the [facility] for treatment of a medical condition.     This patient shoul

## (undated) NOTE — LETTER
Date: 11/6/2019    Patient Name: Kenroy Rene          To Whom it may concern: This letter has been written at the patient's request. The above patient was seen at the [facility] for treatment of a medical condition.     This patient shou

## (undated) NOTE — LETTER
Date: 10/3/2022    Patient Name: Anna Matt          To Whom it may concern: This letter has been written at the patient's request. The above patient was seen at the [facility] for treatment of a medical condition. This patient should be excused from attending school from 09/29/2022 through 09/30/2022.         Sincerely,      Sara Lira MD

## (undated) NOTE — LETTER
Date: 1/22/2020    Patient Name: Ledy Shoemaker          To Whom it may concern: This letter has been written at the patient's request. The above patient was seen at the [facility] for treatment of a medical condition.   Please excuse her a

## (undated) NOTE — LETTER
Date: 10/2/2018    Patient Name: Siddhartha Ochoa          To Whom it may concern: This letter has been written at the patient's request. The above patient was seen at the [facility] for treatment of a medical condition.     This patient shou

## (undated) NOTE — Clinical Note
Dear Dr. Peyton Davis,  Thank you for referring Efrem Collado to the [facility] in [place].   Sincerely,  Gerda Lopez NP

## (undated) NOTE — LETTER
10/07/20        uDtch Botello  [id number] [facility]      Dear Ludy Chand,    [address] [facility] records indicate that you have outstanding lab work and or testing that was ordered for you and has not yet been completed:      CBC With Diff      CMP      Lipid